# Patient Record
Sex: FEMALE | URBAN - METROPOLITAN AREA
[De-identification: names, ages, dates, MRNs, and addresses within clinical notes are randomized per-mention and may not be internally consistent; named-entity substitution may affect disease eponyms.]

---

## 2022-07-19 ENCOUNTER — APPOINTMENT (RX ONLY)
Dept: URBAN - METROPOLITAN AREA CLINIC 77 | Facility: CLINIC | Age: 62
Setting detail: DERMATOLOGY
End: 2022-07-19

## 2022-07-19 DIAGNOSIS — B35.1 TINEA UNGUIUM: ICD-10-CM

## 2022-07-19 DIAGNOSIS — L85.3 XEROSIS CUTIS: ICD-10-CM

## 2022-07-19 PROCEDURE — ? PRESCRIPTION

## 2022-07-19 PROCEDURE — ? TREATMENT REGIMEN

## 2022-07-19 PROCEDURE — 99203 OFFICE O/P NEW LOW 30 MIN: CPT

## 2022-07-19 PROCEDURE — ? COUNSELING

## 2022-07-19 RX ORDER — TAVABOROLE 43.5 MG/ML
SOLUTION TOPICAL
Qty: 4 | Refills: 4 | Status: ERX | COMMUNITY
Start: 2022-07-19

## 2022-07-19 RX ADMIN — TAVABOROLE: 43.5 SOLUTION TOPICAL at 00:00

## 2022-07-19 ASSESSMENT — LOCATION SIMPLE DESCRIPTION DERM
LOCATION SIMPLE: LEFT PRETIBIAL REGION
LOCATION SIMPLE: RIGHT PRETIBIAL REGION

## 2022-07-19 ASSESSMENT — LOCATION DETAILED DESCRIPTION DERM
LOCATION DETAILED: LEFT PROXIMAL PRETIBIAL REGION
LOCATION DETAILED: RIGHT PROXIMAL PRETIBIAL REGION

## 2022-07-19 ASSESSMENT — LOCATION ZONE DERM: LOCATION ZONE: LEG

## 2022-07-19 NOTE — PROCEDURE: MIPS QUALITY
Detail Level: Detailed
Quality 111:Pneumonia Vaccination Status For Older Adults: Pneumococcal vaccine administered on or after patient’s 60th birthday and before the end of the measurement period
Quality 110: Preventive Care And Screening: Influenza Immunization: Influenza Immunization Administered during Influenza season
Quality 431: Preventive Care And Screening: Unhealthy Alcohol Use - Screening: Patient not identified as an unhealthy alcohol user when screened for unhealthy alcohol use using a systematic screening method
Quality 402: Tobacco Use And Help With Quitting Among Adolescents: Patient screened for tobacco and never smoked

## 2022-07-19 NOTE — PROCEDURE: TREATMENT REGIMEN
Plan: Location: Feet\\nPrescribe: Kerydin 5% topical applicator \\nOTC: Tolcylen 1% Solution\\n\\nNew Pt\\nPhotos taken\\n\\nPt comes in today concerned of nail fungus\\nShe states that she has seen her nails become yellow and has never had this in the past\\nShe states her toe nails and her hand nails are experiencing the same discoloration\\nShe has been in the states for a short time and is unsure if it has to do with a change of environment\\nShe comes today for further evaluation and treatment options\\n\\n\\nDiscussed with patient:\\nEvaluating her today there is some yellow to her hand nails and educated pt that fungus does not come up in one week as she states\\nEvaluating her toe nails; they do have signs of some fungus; made pt aware to pay attention if she is eating foods that can make her nail color change\\nEducated patient that fungal infection can grow after having trauma, humid environment, or when threes an opening in the nail bed\\n\\nDiscussed with patient that nail bed typically takes 9-12 months to completely grow out fungal infection.\\nI will prescribe patient topical treatment called Kerydin 5% topical applicator and OTC Tolcylen and she will alternate one in the AM and one in the PM\\nPt voiced understanding\\n\\nPRN
Detail Level: Zone